# Patient Record
(demographics unavailable — no encounter records)

---

## 2024-10-14 NOTE — ASSESSMENT
[FreeTextEntry1] : 62F PMH asthma, DVT/PE on Eliquis, AFib, MS, Sjogren's who presents for f/u visit.   - For now c/w Trelegy 200 - she has not noticed a significant change compared to Advair - She feels she is having an MS exacerbation causing SOB symptoms - She is awaiting a call from her neurologist Dr. Ibanez - Lungs are clear on exam - F/u in 6 weeks time; advised that if SOB or other neurological symptoms worsen she should go to the ED  The patient expressed understanding and agreement with the plan as outlined above and accepts responsibility to be compliant with any recommended testing, treatment, and follow-up visits.  All relevant questions and concerns were addressed.  25 minutes of time were spent on the encounter. Medical records were reviewed, including but not limited to hospital records, outpatient records, laboratory data, and diagnostic imaging studies. Greater than 50% of the face-to-face encounter time was spent on counseling and/or coordination of care.  Amrik Caballero MD, [facility] Pul[facility] Critical [facility] Medicine [facility] Pulmonary [facility] at [place] [address] [place], [place] [address] T: [phone] F: [phone]

## 2024-10-14 NOTE — HISTORY OF PRESENT ILLNESS
[Never] : never [TextBox_4] : 62F PMH asthma, DVT/PE on Eliquis, AFib, MS, Sjogren's who presents for f/u visit. In 07/2024, [place] 250-50 was increased to Trelegy 200. Also taking Montelukast 10mg and albuterol. She feels she is having an MS exacerbation. Was given 500mg SoluMedrol last week x1. No fevers or chills. No chest pains. No N/V/D. She does feel SOB but no wheezing.

## 2024-10-17 NOTE — PHYSICAL EXAM
[Well Developed] : well developed [Well Nourished] : well nourished [Normal Conjunctiva] : normal conjunctiva [Normal S1, S2] : normal S1, S2 [Murmur] : murmur [Clear Lung Fields] : clear lung fields [Normal Gait] : normal gait [Moves all extremities] : moves all extremities [Alert and Oriented] : alert and oriented [de-identified] : 3/6 sm [de-identified] : B/l LE lymphedema

## 2024-10-17 NOTE — REVIEW OF SYSTEMS
[Dyspnea on exertion] : dyspnea during exertion [Palpitations] : palpitations [Chest Discomfort] : no chest discomfort [Lower Ext Edema] : no extremity edema [Syncope] : no syncope

## 2024-10-17 NOTE — CARDIOLOGY SUMMARY
[de-identified] : 2/16/2023: Sinus  Rhythm  -With rate variation \par  - Low voltage in precordial leads. \par  - Negative  T wave in V1-V2 (seen on previous EKG) \par  \par  7/11/2022: Sinus  Rhythm, no ectopy \par  - IRBBB\par  - Negative precordial T-waves, seen on previous EKG  [de-identified] : 1/2022: NO disease, normal EF by LV gram

## 2024-10-17 NOTE — HISTORY OF PRESENT ILLNESS
[FreeTextEntry1] : 62-year-old female for evaluation history of cardiac arrest in 2004 (reports in the setting of anesthesia attempting to gain access to neck during blood patch procedure - was stuck 18 times), multiple sclerosis, hypertension hyperlipidemia type 2 diabetes, paroxysmal A. fib was previously on flecainide but this was discontinued due to prolonged QTC, she has lymphedema as follows vascular surgery.  She was given lymphedema pump and also had Unna boot placed which she took off since her legs were hurting and she could not tolerate it. She states that all her joints are swollen and she has a lot of pain in.   Cardiac catheterization was performed on January 4, 2022 showing no evidence of disease.  LVEF on ventriculogram was 65%.  4/2024 Presents today for preop evaluation. She has lost 80 lbs unintentionally. Unclear etiology. Needs C-scope and EGD. Doing well from cardiac perspective. Occasional chest pains, but she ran out of ranexa. It was controlled up until a couple of weeks ago.   ECG today shows no evidence of ischemia   BP well controlled on current meds. No bleeding from eliquis. In sinus rhythm.  10/2024 Presents today in the middle of an MS flare. She has bowel and bladder incontinence. Due to an MRI next week. She received IV solumedrol with her Tysabri infusion.   ECG NSR without ischemia BP well controlled

## 2024-10-17 NOTE — ADDENDUM
[FreeTextEntry1] : Patient is to undergo bladder surgery. She is a moderate risk patient undergoing a moderate to high risk surgery (possibly open abdominal) She had a normal coronary angiogram in January of 2022 and has no new symptoms since that time. No further cardiac testing is needed prior to proposed bladder surgery. Can hold eliquis three days prior and restart when safe to do so from surgeon's standpoint.

## 2024-10-17 NOTE — DISCUSSION/SUMMARY
[Patient] : the patient [Risks] : risks [Benefits] : benefits [Alternatives] : alternatives [FreeTextEntry1] : 63 y/o female with history of MS (on Tysabri and prednisone), cardiac arrest (2004), HTN, HLD, T2DM, PAF (flecainide was d/c'd early 2020 due to prolonged QTC), leg edema, and prior history of DVT/PE - on lifelong anticoagulation (Eliquis) who reports for preprocedural cardiac risk assessment for upcoming EGD/Colonoscopy.   A/P: #INOCA: Possible microvascular ischemia or spasms - controlled with Ranexa 500 mg BID. [facility] last 1/2022 with no significant CAD. Normal EF. - continue BP control, ranexa, PRN SLN #PAF - SR on EKG and exam. On Eliquis with no bleeding episodes. On BB for rate control.  #. Patient with history of DVT and PE on lifelong anticoagulation.  #HTN: well controlled. Continue losartan # f/u with Neurology for MS.  #. HLD - on statin. Rechecking lipids   FU 6 mo [EKG obtained to assist in diagnosis and management of assessed problem(s)] : EKG obtained to assist in diagnosis and management of assessed problem(s)

## 2024-11-25 NOTE — HISTORY OF PRESENT ILLNESS
[Never] : never [TextBox_4] : 62F PMH asthma, DVT/PE on Eliquis, AFib, MS, Sjogren's who presents for f/u visit. Is on Trelegy 200. Spirometry today showed FEV1/FVC 70%, FEV1 93%, %, LSE00-05 63%, %. In 04/2024 FEV1 was 103.3%. .1%. She is having increasing cough and SOB, as well as wheezing. No fevers, no chills. No N/V/D.

## 2024-11-25 NOTE — ASSESSMENT
[FreeTextEntry1] : 62F PMH asthma, DVT/PE on Eliquis, AFib, MS, Sjogren's who presents for f/u visit.   - Trelegy 200 is not working well for her - Will switch to Advair 230 HFA + Spiriva - 12-day Prednisone taper starting at 60mg given B/L wheezing - Spirometry today showed FEV1/FVC 70%, FEV1 93%, %, [id number] 63%, %. In 04/2024 FEV1 was 103.3%. .1% - This shows worsening obstructive lung disease compared to prior; notably PEF was not reproducible  - F/u in short interim approx 6 weeks  The patient expressed understanding and agreement with the plan as outlined above and accepts responsibility to be compliant with any recommended testing, treatment, and follow-up visits.  All relevant questions and concerns were addressed.  30 minutes of time were spent on the encounter. Medical records were reviewed, including but not limited to hospital records, outpatient records, laboratory data, and diagnostic imaging studies. Greater than 50% of the face-to-face encounter time was spent on counseling and/or coordination of care.  Amrik Caballero MD, [facility] Pulmonary & Critical Care Medicine [facility] Pulmonary and Sleep Medicine at [place] [address] [place], [place] [address] T: [phone] F: [phone]

## 2024-11-25 NOTE — PHYSICAL EXAM
[TextEntry] : Gen - In NAD, communicating easily and in full sentences HEENT - NC/AT CV - +S1, S2 Resp - B/L expiratory wheezing Ext - No pedal edema Skin - No exposed rashes or lesions Neuro - Moves all four extremities Psych - Normal affect

## 2024-12-09 NOTE — ADDENDUM
[FreeTextEntry1] : This note was written by Amna Valencia, acting as the  for Dr. Leon. This note accurately reflects the work and decisions made by Dr. Leon.

## 2024-12-09 NOTE — HISTORY OF PRESENT ILLNESS
[FreeTextEntry1] : YOLY is a 62 year female who presents for f/u on Axonics implant. She is s/p RP sling and cysto 6/7/23. Also had Axonics stim placed 06/2022 and then had the IPG revised 02/2023. Last seen by me on 03/18/2024 for cysto with bladder biopsies. Also sees Venice Jarrett for IC/BPS, getting BLIs last one 11/15/2024. She has been losing weight unintentionally, her doctors are unsure why. With the weight loss, the battery and leads of Axonics device is bothering her with touch. Her symptoms are greatly improved with the implant and she does not want to remove it. She had a recent MS flare up. Recently finished steroids from cardiologist d/t failed breathing test which she gained a few pounds from. States she has plateaued with weight loss.

## 2024-12-09 NOTE — DISCUSSION/SUMMARY
[FreeTextEntry1] : YOLY is a 62 year female who presents for f/u on Axonics implant. She is s/p RP sling and cysto 6/7/23. Also had Axonics stim placed 06/2022 and then had the IPG revised 02/2023. Reports great improvement in her symptoms with implant. She has had unexplained weight loss which is causing her pain at battery and lead sites.   [] Anticipate revision of Axonics Will remove old one and place new one on opposite side. May consider small rechargeable battery.    All questions answered. Patient also seen by Shala from [facility].

## 2024-12-09 NOTE — PHYSICAL EXAM
[Chaperone Present] : A chaperone was present in the examining room during all aspects of the physical examination [No Acute Distress] : in no acute distress [Well developed] : well developed [Well Nourished] : ~L well nourished [Oriented x3] : oriented to person, place, and time [FreeTextEntry2] :  Amna Valencia [de-identified] : battery mobile and tender, lead palpated and tender

## 2024-12-09 NOTE — PHYSICAL EXAM
[Chaperone Present] : A chaperone was present in the examining room during all aspects of the physical examination [No Acute Distress] : in no acute distress [Well developed] : well developed [Well Nourished] : ~L well nourished [Oriented x3] : oriented to person, place, and time [FreeTextEntry2] :  Amna Valencia [de-identified] : battery mobile and tender, lead palpated and tender

## 2024-12-26 NOTE — HISTORY OF PRESENT ILLNESS
[FreeTextEntry1] : 62-year-old female for evaluation history of cardiac arrest in 2004 (reports in the setting of anesthesia attempting to gain access to neck during blood patch procedure - was stuck 18 times), multiple sclerosis, hypertension hyperlipidemia type 2 diabetes, paroxysmal A. fib was previously on flecainide but this was discontinued due to prolonged QTC, she has lymphedema as follows vascular surgery.  She was given lymphedema pump and also had Unna boot placed which she took off since her legs were hurting and she could not tolerate it. She states that all her joints are swollen and she has a lot of pain in.   Cardiac catheterization was performed on January 4, 2022 showing no evidence of disease.  LVEF on ventriculogram was 65%.  4/2024 Presents today for preop evaluation. She has lost 80 lbs unintentionally. Unclear etiology. Needs C-scope and EGD. Doing well from cardiac perspective. Occasional chest pains, but she ran out of ranexa. It was controlled up until a couple of weeks ago.   ECG today shows no evidence of ischemia   BP well controlled on current meds. No bleeding from eliquis. In sinus rhythm.  10/2024 Presents today in the middle of an MS flare. She has bowel and bladder incontinence. Due to an MRI next week. She received IV solumedrol with her Tysabri infusion.   ECG NSR without ischemia BP well controlled  12/2024 Presents for fuv. Yesterday she was driving home from [place] and had pain in the center of her chest. She took aspirin and ranexa. This happened in the middle of a viral flare as well after she had been coughing for several days. No fevers or chills. Pain did not improve until she took nitroglycerin. Took about 8 minutes.   Now she feels better. BP is well controlled.   She is in need of surgery for her axomics readjustment.

## 2024-12-26 NOTE — PHYSICAL EXAM
[Well Developed] : well developed [Well Nourished] : well nourished [Normal Conjunctiva] : normal conjunctiva [Normal S1, S2] : normal S1, S2 [Murmur] : murmur [Clear Lung Fields] : clear lung fields [Normal Gait] : normal gait [Moves all extremities] : moves all extremities [Alert and Oriented] : alert and oriented [de-identified] : 3/6 sm [de-identified] : B/l LE lymphedema

## 2024-12-26 NOTE — CARDIOLOGY SUMMARY
[de-identified] : 2/16/2023: Sinus  Rhythm  -With rate variation \par  - Low voltage in precordial leads. \par  - Negative  T wave in V1-V2 (seen on previous EKG) \par  \par  7/11/2022: Sinus  Rhythm, no ectopy \par  - IRBBB\par  - Negative precordial T-waves, seen on previous EKG  [de-identified] : 1/2022: NO disease, normal EF by LV gram

## 2024-12-30 NOTE — ASSESSMENT
[FreeTextEntry1] : 62F PMH asthma, DVT/PE on Eliquis, AFib, MS, Sjogren's who presents for f/u visit.   - Last visit she was switched from Trelegy 200 to Advair 230 HFA + Spiriva. These have not been covered - I advised her to call her insurance company to inquire as to which ones are covered - Sample of Trelegy 100 given x2 - Spirometry today showed FEV1/FVC 70%, FEV1 86%, FVC 98%, PEF 98%.  - On 11/25/24 her spirometry showed FEV1/FVC 70%, FEV1 93%, %, %.  - She continues to demonstrate a mild degree of obstruction and normal PEF - Will rx 5d of Prednisone 40mg - Will have her take 40mg of Prednisone prior to surgery and 40mg the day after - She is having Alphion bladder stimulator replaced on 1/22/25 with Dr. Arun Leon without general anesthesia - She is an elevated risk for post-operative pulmonary complications - At this time there are no absolute pulmonary contraindications to proceed  - Weigh risks/benefits - Will check CBC, IgE, [place] allergen panel - she may need to be on biologic medication - F/u in 2-3 mo time  The patient expressed understanding and agreement with the plan as outlined above and accepts responsibility to be compliant with any recommended testing, treatment, and follow-up visits.  All relevant questions and concerns were addressed.  30 minutes of time were spent on the encounter. Medical records were reviewed, including but not limited to hospital records, outpatient records, laboratory data, and diagnostic imaging studies. Greater than 50% of the face-to-face encounter time was spent on counseling and/or coordination of care.   Amrik Caballero MD, [facility] Pulmonary & Critical Care Medicine [facility] Pulmonary and Sleep Medicine at [place] [address] [place], [place] [address] T: [phone] F: [phone]

## 2024-12-30 NOTE — HISTORY OF PRESENT ILLNESS
[Never] : never [TextBox_4] : 62F PMH asthma, DVT/PE on Eliquis, AFib, MS, Sjogren's who presents for f/u visit. Last visit she was switched from Trelegy 200 to Advair 230 HFA + Spiriva. In 11/25/24 was given 12d Prednisone taper. CXR 11/1/24 was clear. Spirometry today showed FEV1/FVC 70%, FEV1 86%, FVC 98%, PEF 98%. On 11/25/24 her spirometry showed FEV1/FVC 70%, FEV1 93%, %, %. She finished a second course of Prednisone recently. No fevers, no chills. Has occasional cough productive of specks of blue sputum.

## 2025-01-10 NOTE — HISTORY OF PRESENT ILLNESS
[FreeTextEntry1] : Toshia is here for f/u visit.  She restarted q2 weeks 2% alkalinized lidocaine BLIs in September 2024. IC flared after she passed a kidney stone. She is now doing great once again with BLIs controlling her symptoms well.  She denies bladder pain, UTI symptoms, frequency.  No hematuria. No recent UTIs.  She is having revision of her Axonics device on 1/22/25 with Dr. Leon.  She continues to have unintentional weight loss.

## 2025-01-10 NOTE — PHYSICAL EXAM
[No Acute Distress] : in no acute distress [Well developed] : well developed [Well Nourished] : ~L well nourished [Good Hygeine] : demonstrates good hygeine [Oriented x3] : oriented to person, place, and time [Normal Memory] : ~T memory was ~L unimpaired [Normal Mood/Affect] : mood and affect are normal [Respirations regular] : ~T respiratory rate was regular [Warm and Dry] : was warm and dry to touch [Normal Gait] : gait was normal [No Lesions] : no lesions were seen on the external genitalia [Vulvar Atrophy] : vulvar atrophy [Labia Majora] : were normal [Labia Minora] : were normal [Normal Appearance] : general appearance was normal [Atrophy] : atrophy [No Bleeding] : there was no active vaginal bleeding [Normal] : no abnormalities [Exam Deferred] : was deferred [FreeTextEntry1] : R Axonics battery and lead palpable and tender [No Edema] : ~T edema was present [Tenderness] : ~T no ~M abdominal tenderness observed [Distended] : not distended [de-identified] : Q-tip touch test negative. No erythema, no erosions, no ulcerations, no fissures. [FreeTextEntry4] : PFMs wnl, supple, nontender to palpation

## 2025-01-10 NOTE — DISCUSSION/SUMMARY
[FreeTextEntry1] : Toshia is very happy with her progress and response to 2% alkalinized lidocaine BLIs. I offered to continue them and increase time interval b/t BLIs.  She prefers to hold off on BLIs at this time and restart them with a flare.   f/u Dr. Leon as scheduled.  She knows to call the office and drop off a urine if any s/s UTI. She will call the office and come in for BLI with IC/BPS flare.  RTO 6 months or sooner if needed

## 2025-01-10 NOTE — PROCEDURE
[Patient] : the patient [Consent Obtained] : written consent was obtained prior to the procedure and is detailed in the patient's record [None] : none [Other ___] : [unfilled] [No] : Specimen not sent to Pathology [No Complications] : none [Tolerated Well] : the patient tolerated the procedure well [Post procedure instructions and information given] : post procedure instructions and information given and reviewed with patient. [0] : 0 [FreeTextEntry1] : SEE NOTE BELOW

## 2025-02-06 NOTE — OBJECTIVE
[Clean, Dry, Intact] : Clean, Dry, Intact [FreeTextEntry2] : Bilateral incisions healing well, no erythema or discharge. Palpation over battery produces pain. No fluctuance. Minimal swelling.

## 2025-02-06 NOTE — SUBJECTIVE
[FreeTextEntry8] : no changes [FreeTextEntry7] : Has tried Tylenol, Ibuprofen, heat, ice [FreeTextEntry6] : Poor due to pain [FreeTextEntry4] : Constipation, taking a stool softener [FreeTextEntry5] : No urgency/frequency, denies incomplete emptying [FreeTextEntry3] : No changes [FreeTextEntry2] : Reports poor due to pain [FreeTextEntry9] : Reports severe pain over her left buttock from her Axonics battery. She feels that it is pulling and tugging and is too superficial

## 2025-02-06 NOTE — DISCUSSION/SUMMARY
[FreeTextEntry1] : Toshia is a 61 y/o s/p Axonics revision on 1/22/2025 with Dr Leon. She is healing well from surgery. Reassured patient that the battery is in the correct location. No evidence of infection on exam. Encouraged patient to allow for more time to heal and for the battery to scar into place.   Shala checked impedance and was normal  [] RTO on 3/10 for 6 week post-op appointment  Patient seen and discussed with Dr Leon.

## 2025-02-06 NOTE — ADDENDUM
[FreeTextEntry1] : Patient seen and examined. Agree with above. No obvious signs of infection or fluid collection. Battery feels 2 cm below skin in gluteal adipose. It is not shifting with exam. I sent an Rx for ultram for her pain. I reassured her that I think this is healing. If continues will get Xray to confirm location.

## 2025-03-10 NOTE — DISCUSSION/SUMMARY
[FreeTextEntry1] : TOSHIA is a 63 year female who presents for f/u s/p revision of Axonics sacral neuromodulator IPG and lead on 1/22/25. Healing well. Cleared to gradually return to regular activities.  IPG feels to be in the right location, no collection palpated, feels 2cm below skin, not protruding. I asked Toshia to give it more time to heal.   [] Follow up in 1 months.  All questions answered.

## 2025-03-10 NOTE — ASSESSMENT
[FreeTextEntry1] : 63F PMH asthma, DVT/PE on Eliquis, AFib, MS, Sjogren's who presents for f/u visit.   - Pt has class 1 allergies to dust, IgE 167, eosinophil count 180.  - She is on Advair -21 + Spiriva.  - Was at [facility] 2/12/25 for lightheadedness/dizziness. Had a CTPA negative for PE, no other acute findings. Troponin negative x2. - Given her history of Sjogren's, I question an underlying autoimmune vs inflammatory disease - Will check inflammatory serum panel - Will check LE dopplers - Will f/u in short interim to discuss results  The patient expressed understanding and agreement with the plan as outlined above and accepts responsibility to be compliant with any recommended testing, treatment, and follow-up visits.  All relevant questions and concerns were addressed.  30 minutes of time were spent on the encounter. Medical records were reviewed, including but not limited to hospital records, outpatient records, laboratory data, and diagnostic imaging studies. Greater than 50% of the face-to-face encounter time was spent on counseling and/or coordination of care.   Amrik Caballero MD, [facility] Pulmonary & Critical Care Medicine [facility] Pulmonary and Sleep Medicine at [place] [address] [place], [place] [address] T: [phone] F: [phone]

## 2025-03-10 NOTE — HISTORY OF PRESENT ILLNESS
[FreeTextEntry1] : YOLY is a 63 year female who presents for f/u s/p revision of Axonics sacral neuromodulator IPG and lead on 1/22/25. Last seen for 2 week post op visit 2/6/25. Feels like she is sitting on the IPG. Has to take Tylenol and ibuprofen because she always feels bruised inthe area. Told by primary that her hgb has dropped since the surgery. She had CT scan in the last couple weeks to rule out internal bleeding. She feels the stim working to help her urinary frequency and leaking are much better.

## 2025-03-10 NOTE — HISTORY OF PRESENT ILLNESS
[Never] : never [TextBox_4] : 63F PMH asthma, DVT/PE on Eliquis, AFib, MS, Sjogren's who presents for f/u visit. Pt has class 1 allergies to dust, IgE 167, eosinophil count 180. She is on Advair -21 + Spiriva. Was at [facility] 2/12/25 for lightheadedness/dizziness. Had a CTPA negative for PE, no other acute findings. Troponin negative x2. She continues to feel fatigued, with LE edema and pain, SOB.

## 2025-03-10 NOTE — RESULTS/DATA
[TextEntry] : [facility] ACC: [id number] EXAM: CAT 9066 _ CT ANGIO CHEST PULM ART [facility]   PROCEDURE DATE: Feb 12 2025   CLINICAL INFORMATION: Chest pain.  COMPARISON: None.  CONTRAST/COMPLICATIONS: IV Contrast: Omnipaque 350 70 cc administered 30 cc discarded Oral Contrast: NONE .  PROCEDURE: CT Angiography of the Chest. Sagittal and coronal reformats were performed as well as 3D (MIP) reconstructions.  FINDINGS:  LUNGS AND LARGE AIRWAYS: Patent central airways. No consolidation. 4 mm juxtapleural right upper lobe pulmonary nodule. PLEURA: No pleural effusion. VESSELS: Suboptimal opacification of the pulmonary vasculature. No main, lobar and proximal segmental filling defect. The main pulmonary artery and thoracic aorta are normal in caliber. Tip of Ntvqjk-[id number]-Rfju catheter within the right atrium. HEART: Heart size is is not globally enlarged however the left ventricle appears dilated. No pericardial effusion. MEDIASTINUM AND KELL: No lymphadenopathy. CHEST WALL AND LOWER NECK: Left chest wall Aocafv-r-Gxgl catheter. VISUALIZED UPPER ABDOMEN: Cholecystectomy. Postoperative changes at the GE junction. 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 BONES: Chronic T6 fracture. No acute osseous finding.  IMPRESSION: Suboptimal study. No main, lobar or proximal segmental pulmonary embolism. No evidence of pneumonia.    --- End of Report ---       BRET RAMOS MD; Attending Radiologist This document has been electronically signed. Feb 12 2025 2:44AM   ~~~~~~~~~~~~~~~~~~~~~~~~~~~~~~~~~~~~~~~~~~~~~~~~~~~~~~~~~~~~~~~~~~~~~~~~

## 2025-03-10 NOTE — ADDENDUM
[FreeTextEntry1] : This note was written by Nery Orellana, acting as the  for Dr. Leon. This note accurately reflects the work and decisions made by Dr. Leon.

## 2025-04-03 NOTE — DISCUSSION/SUMMARY
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   A/P: #Preoperative evaluation Moderate risk patient undergoing low risk surgery  No need for further cardiac testing  #Palpitations - check 1 week holter - check echo  #INOCA/Vasospasm Possible microvascular ischemia or spasms - controlled with Ranexa 500 mg BID. [facility] last 1/2022 with no significant CAD. Normal EF. - continue BP control, ranexa, PRN SLN - Stop losartan, start amlodipine for vasospasm #PAF - SR on EKG and exam. On Eliquis with no bleeding episodes. On BB for rate control.  #. Patient with history of DVT and PE on lifelong anticoagulation.  #HTN: well controlled. Continue losartan # f/u with Neurology for MS.  #. HLD - on statin. Rechecking lipids   FU 6 mo [EKG obtained to assist in diagnosis and management of assessed problem(s)] : EKG obtained to assist in diagnosis and management of assessed problem(s)

## 2025-04-03 NOTE — PHYSICAL EXAM
[Well Developed] : well developed [Well Nourished] : well nourished [Normal Conjunctiva] : normal conjunctiva [Normal S1, S2] : normal S1, S2 [Murmur] : murmur [Clear Lung Fields] : clear lung fields [Normal Gait] : normal gait [Moves all extremities] : moves all extremities [Alert and Oriented] : alert and oriented [de-identified] : 3/6 sm [de-identified] : B/l LE lymphedema

## 2025-04-03 NOTE — CARDIOLOGY SUMMARY
[de-identified] : 2/16/2023: Sinus  Rhythm  -With rate variation \par  - Low voltage in precordial leads. \par  - Negative  T wave in V1-V2 (seen on previous EKG) \par  \par  7/11/2022: Sinus  Rhythm, no ectopy \par  - IRBBB\par  - Negative precordial T-waves, seen on previous EKG  [de-identified] : 1/2022: NO disease, normal EF by LV gram

## 2025-04-03 NOTE — REVIEW OF SYSTEMS
[Dyspnea on exertion] : dyspnea during exertion [Chest Discomfort] : no chest discomfort [Lower Ext Edema] : no extremity edema [Palpitations] : palpitations [Syncope] : no syncope

## 2025-04-03 NOTE — PHYSICAL EXAM
[Well Developed] : well developed [Well Nourished] : well nourished [Normal Conjunctiva] : normal conjunctiva [Normal S1, S2] : normal S1, S2 [Murmur] : murmur [Clear Lung Fields] : clear lung fields [Normal Gait] : normal gait [Moves all extremities] : moves all extremities [Alert and Oriented] : alert and oriented [de-identified] : 3/6 sm [de-identified] : B/l LE lymphedema

## 2025-04-03 NOTE — DISCUSSION/SUMMARY
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   A/P: #Preoperative evaluation Moderate risk patient undergoing low risk surgery  No need for further cardiac testing  #Palpitations - check 1 week holter - check echo  #INOCA/Vasospasm Possible microvascular ischemia or spasms - controlled with Ranexa 500 mg BID. [facility] last 1/2022 with no significant CAD. Normal EF. - continue BP control, ranexa, PRN SLN - Stop losartan, start amlodipine for vasospasm #PAF - SR on EKG and exam. On Eliquis with no bleeding episodes. On BB for rate control.  #. Patient with history of DVT and PE on lifelong anticoagulation.  #HTN: well controlled. Continue losartan # f/u with Neurology for MS.  #. HLD - on statin. Rechecking lipids   FU 6 mo [EKG obtained to assist in diagnosis and management of assessed problem(s)] : EKG obtained to assist in diagnosis and management of assessed problem(s)

## 2025-04-03 NOTE — HISTORY OF PRESENT ILLNESS
[FreeTextEntry1] : 62-year-old female for evaluation history of cardiac arrest in 2004 (reports in the setting of anesthesia attempting to gain access to neck during blood patch procedure - was stuck 18 times), multiple sclerosis, hypertension hyperlipidemia type 2 diabetes, paroxysmal A. fib was previously on flecainide but this was discontinued due to prolonged QTC, she has lymphedema as follows vascular surgery.  She was given lymphedema pump and also had Unna boot placed which she took off since her legs were hurting and she could not tolerate it. She states that all her joints are swollen and she has a lot of pain in.   Cardiac catheterization was performed on January 4, 2022 showing no evidence of disease.  LVEF on ventriculogram was 65%.  4/2024 Presents today for preop evaluation. She has lost 80 lbs unintentionally. Unclear etiology. Needs C-scope and EGD. Doing well from cardiac perspective. Occasional chest pains, but she ran out of ranexa. It was controlled up until a couple of weeks ago.   ECG today shows no evidence of ischemia   BP well controlled on current meds. No bleeding from eliquis. In sinus rhythm.  10/2024 Presents today in the middle of an MS flare. She has bowel and bladder incontinence. Due to an MRI next week. She received IV solumedrol with her Tysabri infusion.   ECG NSR without ischemia BP well controlled  12/2024 Presents for fuv. Yesterday she was driving home from [place] and had pain in the center of her chest. She took aspirin and ranexa. This happened in the middle of a viral flare as well after she had been coughing for several days. No fevers or chills. Pain did not improve until she took nitroglycerin. Took about 8 minutes.   Now she feels better. BP is well controlled.   She is in need of surgery for her axomics readjustment.   3/2025 Presents for fuv. Feels well and has no complaints. Denies chest pains but has palpitations now. She is also iron deficient---going to colonoscopy. Also need cataract surgery.  Otherwise, denies orthopnea, paroxysmal nocturnal dyspnea, lower extremity edema, unexplained weight gain or dyspnea on exertion.

## 2025-04-03 NOTE — CARDIOLOGY SUMMARY
[de-identified] : 2/16/2023: Sinus  Rhythm  -With rate variation \par  - Low voltage in precordial leads. \par  - Negative  T wave in V1-V2 (seen on previous EKG) \par  \par  7/11/2022: Sinus  Rhythm, no ectopy \par  - IRBBB\par  - Negative precordial T-waves, seen on previous EKG  [de-identified] : 1/2022: NO disease, normal EF by LV gram

## 2025-04-14 NOTE — DISCUSSION/SUMMARY
[FreeTextEntry1] : YOLY is a 63 year female who presents for f/u s/p revision of [employer]onics sacral neuromodulator IPG and lead on 01/22/2025.  Wants to remove the current IPG but she feels the therapy is helpful. She would like to move it more laterally.   All questions answered.

## 2025-04-14 NOTE — HISTORY OF PRESENT ILLNESS
[FreeTextEntry1] : YOLY is a 63 year female who presents for f/u s/p revision of [employer] sacral neuromodulator IPG and lead on 01/22/2025. Last seen in office on 03/10/2025 with improvement in urinary symptoms but stated that it feels like she is sitting on the IPG. On last exam, IPG feels to be in the right location, no collection palpated, feels 2cm below skin, not protruding.  Feels more pain in the area of battery. She also feels over the last week she is leaking more.

## 2025-05-01 NOTE — CARDIOLOGY SUMMARY
[de-identified] : 2/16/2023: Sinus  Rhythm  -With rate variation \par  - Low voltage in precordial leads. \par  - Negative  T wave in V1-V2 (seen on previous EKG) \par  \par  7/11/2022: Sinus  Rhythm, no ectopy \par  - IRBBB\par  - Negative precordial T-waves, seen on previous EKG  [de-identified] : 1/3/2024 stress echo: Patient achieved 84% of maximal predicted heart rate.  There were no ST-T wave changes.  The echocardiographic portion of the exam was negative. [de-identified] : 5/23/20231. The left ventricular systolic function is normal with an ejection fraction of 72 % by Soto's method of disks with an ejection fraction visually estimated at 70 to 75 %. 2. The left ventricular diastolic function is indeterminate. 3. Trace aortic regurgitation. 4. The right ventricle is not well visualized. 5. No pericardial effusion seen.  [de-identified] : 1/2022: NO disease, normal EF by LV gram

## 2025-05-01 NOTE — HISTORY OF PRESENT ILLNESS
[FreeTextEntry1] : 62-year-old female for evaluation history of cardiac arrest in 2004 (reports in the setting of anesthesia attempting to gain access to neck during blood patch procedure - was stuck 18 times), multiple sclerosis, hypertension hyperlipidemia type 2 diabetes, paroxysmal A. fib was previously on flecainide but this was discontinued due to prolonged QTC, she has lymphedema as follows vascular surgery.  She was given lymphedema pump and also had Unna boot placed which she took off since her legs were hurting and she could not tolerate it. Cardiac catheterization was performed on January 4, 2022 showing no evidence of disease.  LVEF on ventriculogram was 65%.  Today patient is here for evaluation of worsening of lower extremity edema and episode of low blood pressure.  Patient was seen by her primary care physician who was very concerned about her edema and told her that she needs to see a cardiologist and may need diuretics.  Patient states that the edema has been going on for the last couple of weeks and her legs are more swollen than they have been recently.  It is associated with severe tenderness all over her leg.  She denies presence of chest pain, lightheadedness, dizziness, palpitations, PND or orthopnea.  She is complaining of temporal headache, but denies jaw claudication or jaw pain.  She also denies any weakness in her shoulders or pain in her shoulders.

## 2025-05-01 NOTE — DISCUSSION/SUMMARY
[FreeTextEntry1] : 1.  Lower extremity edema-patient with chronic lymphedema who states now it is getting worse.  Patient is on calcium channel blockers.  Will discontinue calcium channel blockers.  Patient does not need diuretics at this time.  Will repeat an echocardiogram that was ordered during the last visit.  Will check TSH.  2.  Hypertension-blood pressure is controlled.  Had 1 episode of hypotension with a BP systolic of 95.  Discontinuing calcium channel blocker.  Patient advised to monitor BP and if it increases above 140 systolic, patient will call the office.  Would recommend restarting losartan that patient has previously tolerated.  3.  Headache-patient with history of chronic migraines.  Blood tests tenderness over temporal area.  No jaw claudication.  Will check ESR.

## 2025-05-01 NOTE — REASON FOR VISIT
[FreeTextEntry1] : 62-year-old female for evaluation history of cardiac arrest in 2004 (reports in the setting of anesthesia attempting to gain access to neck during blood patch procedure - was stuck 18 times), multiple sclerosis, hypertension hyperlipidemia type 2 diabetes, paroxysmal A. fib was previously on flecainide but this was discontinued due to prolonged QTC, she has lymphedema as follows vascular surgery here for evaluation of worsening of lower extremity edema and episode of low blood pressure.

## 2025-05-01 NOTE — ADDENDUM
[FreeTextEntry1] : Patient is to undergo EGD / colonoscopy. She is a moderate risk patient undergoing a low risk procedure. There is no data that shows cardiac risk stratification prior to EGD / colonoscopy confers any difference in outcome. Nonetheless she can tolerate 4 Mets and does not need any further work up prior to her procedure. I do not recommend any cardiac testing at this time. The cardiac testing that is already ordered including an echocardiogram and cardiac monitor are independent of this risk stratification.

## 2025-06-18 NOTE — REVIEW OF SYSTEMS
[TextEntry] : Constitutional, HEENT, cardiovascular, respiratory, gastrointestinal, genitourinary, musculoskeletal, neurological, endocrine review of systems are otherwise negative except as noted in the history of present illness.

## 2025-06-18 NOTE — PHYSICAL EXAM
[TextEntry] : Gen - In NAD, communicating easily and in full sentences HEENT - NC/AT CV - +S1, S2 Resp - CTA B/L, good inspiratory effort, non-labored breathing Ext - No pedal edema Skin - No exposed rashes or lesions Neuro - Moves all four extremities Psych - Normal affect

## 2025-06-18 NOTE — ASSESSMENT
[FreeTextEntry1] : 63F PMH asthma, DVT/PE on Eliquis, AFib, environmental allergies, MS, Sjogren's who presents for f/u visit.   - On 6/25/25 pt is having her [employer]onics urinary incontinence device removed with Dr. Leon at [facility].  - Overall feels well.  - C/w Advair HFA + Spiriva - Spirometry today showed FEV1/FVC 73%, FEV1 106%, %, % - This is a normal study and 20% improvement in FEV1 compared to prior - Pt is an elevated risk for post-operative pulmonary complications given her h/o asthma - Consider akila-operative nebulizer treatments - Consider extubation to BPAP/CPAP should there be any issues with ventilation during anesthesia - At this time there do not appear to be pulmonary contraindications to proceed with surgery - Can f/u with me in 4 mo time  The patient expressed understanding and agreement with the plan as outlined above and accepts responsibility to be compliant with any recommended testing, treatment, and follow-up visits.  All relevant questions and concerns were addressed.   30 minutes of time were spent on the encounter. Medical records were reviewed, including but not limited to hospital records, outpatient records, laboratory data, and diagnostic imaging studies. Greater than 50% of the face-to-face encounter time was spent on counseling and/or coordination of care.  30 minutes of time were spent on the encounter. Medical records were reviewed, including but not limited to hospital records, outpatient records, laboratory data, and diagnostic imaging studies. Greater than 50% of the face-to-face encounter time was spent on counseling and/or coordination of care.  Amrik Caballero MD, [facility]monary & Critical Care Medicine [facility] at [place] [address] [place], [place] [address] T: [phone] F: [phone]

## 2025-06-18 NOTE — HISTORY OF PRESENT ILLNESS
[Never] : never [TextBox_4] : 63F PMH asthma, DVT/PE on Eliquis, AFib, environmental allergies, MS, Sjogren's who presents for f/u visit. On 6/25/25 pt is having her Ax[employer]ics urinary incontinence device removed with Dr. Leon at [facility]. Overall feels well. Is on Advair HFA + Spiriva, no issues. No fevers, no chills, no recent flare up of asthma.

## 2025-07-15 NOTE — OBJECTIVE
[FreeTextEntry2] : mild erythema and bleeding noted at incision site; pt on eliquis; minimal amount of serosanguinous drainage noted

## 2025-07-15 NOTE — DISCUSSION/SUMMARY
[FreeTextEntry1] : Pt aware any issues or concerns with the device to contact [employer] representative. Pt to F/U with our office as needed. Pt verbalized understanding. All questions answered. Instructed to call with any questions or concerns.

## 2025-07-15 NOTE — SUBJECTIVE
[FreeTextEntry1] : Overall doing well and very happy with Axonics full implant. [FreeTextEntry8] : None new [FreeTextEntry7] : Denies [FreeTextEntry6] : Normal. Denies n/v [FreeTextEntry5] : Denies any increase in urinary frequency or urgency. Denies fever, chills, dysuria, hematuria or flank pain. Denies sensation of incomplete bladder emptying.  [FreeTextEntry4] : Normal [FreeTextEntry3] : Normal [FreeTextEntry2] : Normal

## 2025-07-15 NOTE — ASSESSMENT
[FreeTextEntry1] : 64yo F s/p replacement of non-rechargeable battery closure of pocket and placement of new rechargeable IPG for [employer] sacral neuromodulation. Op report reviewed Pt examined by FRANCES Bullock mild erythema and bleeding noted at incision site; pt on Eliquis; minimal amount of serosanguinous drainage noted. Dermabond applied and reinforced with steri strips. Pt states she is about 80-90% improved and very happy with current status. Pt seen by John Last rep, no changes required.

## 2025-07-17 NOTE — DISCUSSION/SUMMARY
[FreeTextEntry1] : TREY NYE is here today for a follow-up visit for s/p Replacement of non rechargeable battery closure of pocket and placement of new rechargeable IPG for [employer] sacral neuromodulation with MD Leon on 6/25/25.presents for follow up regarding incision.  Incisions healing well. Patient reassured of usual healing process. Counseled patient on taking OTC tylenol and ibuprofen. Patient verbalized understanding and was grateful.   Patient discussed with Dr. Potter

## 2025-07-17 NOTE — HISTORY OF PRESENT ILLNESS
[FreeTextEntry1] : YOLY NYE is here today for a follow-up visit for s/p Replacement of non rechargeable battery closure of pocket and placement of new rechargeable IPG for Ax[employer] sacral neuromodulation with MD Leon on 6/25/25. She was last seen on 7/15/25 for 2 week follow up and was noted to have some incision separation as well as mild serous fluid. Incision was cleaned and reaproximated with dermabond and steristrips at that time  Today patient states she noticed a lump under the skin and was nervous as she was told she would not feel the battery.   Denies fever, chills, bleeding from incision

## 2025-07-17 NOTE — PHYSICAL EXAM
[No Acute Distress] : in no acute distress [Well developed] : well developed [Well Nourished] : ~L well nourished [Oriented x3] : oriented to person, place, and time [Normal Memory] : ~T memory was ~L unimpaired [Normal Mood/Affect] : mood and affect are normal [No Edema] : ~T edema was not present [FreeTextEntry1] : 2 Healing incisions noted  on the left lower back aproximately 2 inches each The first located over the left Iliac crest with steristrips. Incision Clean Dry and intact, milk 1cm of light bruising noted at the left end of incision. Incision palpated and battery noted with minimal scar  sub Q scar tissue.  The second healing well with one suture knot noted on the left end of incision. [Cough] : no cough

## 2025-07-25 NOTE — DISCUSSION/SUMMARY
[FreeTextEntry1] : YOLY NYE is here today for a follow-up visit for s/p Replacement of non rechargeable battery closure of pocket and placement of new rechargeable IPG for Axonic sacral neuromodulation with MD Leon on 6/25/25. resents for follow up regarding incision.  - Incision with small amount of erythema and pinpoint drainage. Counseled patient that she may have had some blood tinged serous fluid draining the pinpoint superficial opening  -Augmentin 875/125mg PO BID for 7 days sent to pharmacy. Patient states she has taken Augmentin before and tolerates it well  - Patient reassured of usual healing process. Counseled patient on taking OTC tylenol and ibuprofen and cleaning the area. Patient verbalized understanding and was grateful.  Discussed with Dr. Raines

## 2025-07-25 NOTE — ASSESSMENT
[FreeTextEntry1] : YOLY NYE is here today for a follow-up visit for s/p Replacement of non rechargeable battery closure of pocket and placement of new rechargeable IPG for Axonic sacral neuromodulation with MD Leon on 6/25/25. She was seen on 7/15/25 for 2 week follow up and was noted to have some incision separation as well as mild serous fluid. Incision was cleaned and reaproximated with dermabond and steristrips at that time. She was again seen on 7/17 for noticing a lump under the incision- good healing was noted at that time, steri strips were left in place  Today patient states she has had bleeding from the incision for a few days as well as soreness. Denies leakage and urgency Denies fever, chills, severe pain.

## 2025-07-25 NOTE — OBJECTIVE
[FreeTextEntry2] : Incision with proper healing, with a pinpoint area of superficial opening, no large enough to explore with a q-tip 1x2 cm area of rednes around half of the incision, minimal bruising noted, battery palpated under sking